# Patient Record
Sex: MALE | Race: WHITE | Employment: PART TIME | URBAN - METROPOLITAN AREA
[De-identification: names, ages, dates, MRNs, and addresses within clinical notes are randomized per-mention and may not be internally consistent; named-entity substitution may affect disease eponyms.]

---

## 2018-09-05 ENCOUNTER — HOSPITAL ENCOUNTER (EMERGENCY)
Facility: CLINIC | Age: 33
Discharge: HOME | End: 2018-09-06
Attending: EMERGENCY MEDICINE | Admitting: EMERGENCY MEDICINE
Payer: COMMERCIAL

## 2018-09-05 ENCOUNTER — APPOINTMENT (OUTPATIENT)
Dept: GENERAL RADIOLOGY | Facility: CLINIC | Age: 33
End: 2018-09-05
Attending: EMERGENCY MEDICINE
Payer: COMMERCIAL

## 2018-09-05 VITALS
SYSTOLIC BLOOD PRESSURE: 122 MMHG | TEMPERATURE: 98 F | OXYGEN SATURATION: 100 % | DIASTOLIC BLOOD PRESSURE: 70 MMHG | HEART RATE: 70 BPM | HEIGHT: 72 IN | RESPIRATION RATE: 16 BRPM

## 2018-09-05 DIAGNOSIS — S62.617A CLOSED DISPLACED FRACTURE OF PROXIMAL PHALANX OF LEFT LITTLE FINGER, INITIAL ENCOUNTER: ICD-10-CM

## 2018-09-05 DIAGNOSIS — S63.287A DISLOCATION OF PROXIMAL INTERPHALANGEAL JOINT OF LEFT LITTLE FINGER, INITIAL ENCOUNTER: ICD-10-CM

## 2018-09-05 PROCEDURE — 29125 APPL SHORT ARM SPLINT STATIC: CPT | Mod: LT | Performed by: EMERGENCY MEDICINE

## 2018-09-05 PROCEDURE — 99284 EMERGENCY DEPT VISIT MOD MDM: CPT | Mod: 25 | Performed by: EMERGENCY MEDICINE

## 2018-09-05 PROCEDURE — 73140 X-RAY EXAM OF FINGER(S): CPT | Mod: LT

## 2018-09-05 RX ORDER — HYDROCODONE BITARTRATE AND ACETAMINOPHEN 5; 325 MG/1; MG/1
1-2 TABLET ORAL EVERY 4 HOURS PRN
Qty: 12 TABLET | Refills: 0 | Status: SHIPPED | OUTPATIENT
Start: 2018-09-05 | End: 2021-03-08

## 2018-09-05 ASSESSMENT — PAIN DESCRIPTION - DESCRIPTORS: DESCRIPTORS: ACHING

## 2018-09-05 NOTE — ED AVS SNAPSHOT
[facility] Emergency Department    [address] [address]    Phone:  [phone]    Fax:  [phone]                                       Fabricio Mayberry   MRN: [id number]    Department:  [facility] Emergency Department   Date of Visit:  9/5/2018           Patient Information     Date Of Birth          1985        Your diagnoses for this visit were:     Dislocation of proximal interphalangeal joint of left little finger, initial encounter reduced by patient prior to ED evaluation    Closed displaced fracture of proximal phalanx of left little finger, initial encounter        You were seen by Reuben Bhatt MD.      Follow-up Information     Follow up with [facility].    Why:  A referral was made for you, they will call to establish a follow-up appointment.        Discharge Instructions         Finger Dislocation  A finger dislocation occurs when the tissues, or ligaments, that hold the joint together are torn. The bones then move apart, or are dislocated, out of their normal position. This causes pain, swelling, and bruising. Sometimes there is also a small chip fracture. Once the joint is put back into place again, it will take about 6 weeks for the ligaments to heal. During this time, you should protect your finger from re-injury.     Yash taping is a way to secure your injured finger to the one next to it. Yash taping allows the joint to move. But it also protects it from dislocating again. Yash tape can be left in place for up to 6 weeks.  Hand exercises may be prescribed at your follow-up visit. These can help speed healing and maintain function. In most cases you will regain full function of your finger. But it may take 12 to 18 months before all mild pain and swelling goes away and full function returns.  Home care    Keep your hand raised, or elevated, to reduce pain and swelling. When sitting or lying down, raise your arm above the level of your  heart. You can do this by placing your arm on a pillow that rests on your chest. Or your arm can be on a pillow at your side. This is most important during the first 48 hours after injury.    Put an ice pack on the injured area for no more than 15 to 20 minutes every 3 to 6 hours. Do this for the first 24 to 48 hours. You can make an ice pack by wrapping a plastic Ziploc bag of ice cubes in a thin towel. As the ice melts, be careful that the tape, gauze, or splint doesn t get wet. After that, keep using ice as needed to ease pain and swelling.    If you have a removable splint, you may take it off to bathe and then put it back on. If you have a permanent splint, cover your entire hand with 2 plastic bags. Place 1 bag around the other. Tape each bag with duct tape at the top end. Water can still leak in even when your hand is covered. So it's best to keep the splint away from water. If a splint gets wet, you can dry it with a hair-dryer on a cool setting.     If you use buddy tape and it becomes wet or dirty, change it. You may replace it with paper, plastic, or cloth tape. Cloth tape and paper tapes must be kept dry. When re-applying buddy tape, use gauze or cotton padding between your fingers. This will prevent the skin from getting moist and breaking down, or macerating. It is very important to put padding at the web space. This is the small piece of skin that joins the bases of your fingers. Keep the buddy tape in place as instructed by your healthcare provider.    You may use over-the-counter pain medicine to control pain, unless another pain medicine was prescribed. Talk with your provider before taking these medicines if you have chronic liver or kidney disease. Also talk with your provider if you have ever had a stomach ulcer or GI (gastrointestinal) bleeding.    Do not play sports or do any physical exercise until your healthcare provider says that you can.  Follow-up care  Follow up with your healthcare  provider in 1 week, or as advised. Splints should generally not be left in place longer than 3 weeks to avoid stiffness and loss of joint function. It is important that you see the referral doctor. This provider can determine how long to keep your splint in place and when to begin hand exercises.  If X-rays were taken, you will be told of any new findings that may affect your care.  When to seek medical advice  Call your healthcare provider right away if any of the following occur:    The injured finger has more pain or swelling    The injured finger becomes red or warm    The injured finger becomes cold, blue, numb, or tingly  Date Last Reviewed: 11/23/2015 2000-2017 [employer]. [address] [place] [address], [place] [address]. All rights reserved. This information is not intended as a substitute for professional medical care. Always follow your healthcare professional's instructions.          Discharge References/Attachments     FRACTURE, FINGER, CLOSED (ENGLISH)      24 Hour Appointment Hotline       To make an appointment at any [facility] clinic, call [phone] ([phone]). If you don't have a family doctor or clinic, we will help you find one. [facility] clinics are conveniently located to serve the needs of you and your family.          ED Discharge Orders     ORTHO  REFERRAL       [facility] is referring you to the Orthopedic  Services at [facility].       The  Representative will assist you in the coordination of your Orthopedic and Musculoskeletal Care as prescribed by your physician.    The  Representative will call you within 1 business day to help schedule your appointment, or you may contact the  Representative at:    All areas ~ [phone]     Type of Referral : Surgical / Specialist       Timeframe requested: 1 - 4 days    Coverage of these services is subject to the terms and limitations of your  health insurance plan.  Please call member services at your health plan with any benefit or coverage questions.      If X-rays, CT or MRI's have been performed, please contact the facility where they were done to arrange for , prior to your scheduled appointment.  Please bring this referral request to your appointment and present it to your specialist.                     Review of your medicines      START taking        Dose / Directions Last dose taken    HYDROcodone-acetaminophen 5-325 MG per tablet   Commonly known as:  NORCO   Dose:  1-2 tablet   Quantity:  12 tablet        Take 1-2 tablets by mouth every 4 hours as needed for moderate to severe pain or severe pain maximum 6 tablet(s) per day   Refills:  0          Our records show that you are taking the medicines listed below. If these are incorrect, please call your family doctor or clinic.        Dose / Directions Last dose taken    clindamycin 1 % solution   Commonly known as:  CLEOCIN T   Quantity:  30cc        apply to acne at bedtime   Refills:  one year        NO ACTIVE MEDICATIONS        .   Refills:  0                Information about OPIOIDS     PRESCRIPTION OPIOIDS: WHAT YOU NEED TO KNOW   We gave you an opioid (narcotic) pain medicine. It is important to manage your pain, but opioids are not always the best choice. You should first try all the other options your care team gave you. Take this medicine for as short a time (and as few doses) as possible.    Some activities can increase your pain, such as bandage changes or therapy sessions. It may help to take your pain medicine 30 to 60 minutes before these activities. Reduce your stress by getting enough sleep, working on hobbies you enjoy and practicing relaxation or meditation. Talk to your care team about ways to manage your pain beyond prescription opioids.    These medicines have risks:    DO NOT drive when on new or higher doses of pain medicine. These medicines can affect your  alertness and reaction times, and you could be arrested for driving under the influence (DUI). If you need to use opioids long-term, talk to your care team about driving.    DO NOT operate heavy machinery    DO NOT do any other dangerous activities while taking these medicines.    DO NOT drink any alcohol while taking these medicines.     If the opioid prescribed includes acetaminophen, DO NOT take with any other medicines that contain acetaminophen. Read all labels carefully. Look for the word  acetaminophen  or  Tylenol.  Ask your pharmacist if you have questions or are unsure.    You can get addicted to pain medicines, especially if you have a history of addiction (chemical, alcohol or substance dependence). Talk to your care team about ways to reduce this risk.    All opioids tend to cause constipation. Drink plenty of water and eat foods that have a lot of fiber, such as fruits, vegetables, prune juice, apple juice and high-fiber cereal. Take a laxative (Miralax, milk of magnesia, Colace, Senna) if you don t move your bowels at least every other day. Other side effects include upset stomach, sleepiness, dizziness, throwing up, tolerance (needing more of the medicine to have the same effect), physical dependence and slowed breathing.    Store your pills in a secure place, locked if possible. We will not replace any lost or stolen medicine. If you don t finish your medicine, please throw away (dispose) as directed by your pharmacist. The [place] [employer]ution [employer] has more information about safe disposal: https://www.pca.[facility].[place].us/living-green/managing-unwanted-medications        Prescriptions were sent or printed at these locations (1 Prescription)                   Other Prescriptions                Printed at Department/Unit printer (1 of 1)         HYDROcodone-acetaminophen (NORCO) 5-325 MG per tablet                Procedures and tests performed during your visit     Fingers XR, 2-3 views, left     "  Orders Needing Specimen Collection     None      Pending Results     Date and Time Order Name Status Description    9/5/2018 2242 Fingers XR, 2-3 views, left Preliminary             Pending Culture Results     No orders found for last 3 day(s).            Pending Results Instructions     If you had any lab results that were not finalized at the time of your Discharge, you can call the ED Lab Result RN at [phone]. You will be contacted by this team for any positive Lab results or changes in treatment. The nurses are available 7 days a week from 10A to 6:30P.  You can leave a message 24 hours per day and they will return your call.        Test Results From Your Hospital Stay        9/5/2018 11:06 PM      Narrative     LEFT FIFTH FINGER 3 VIEWS  9/5/2018 10:59 PM     HISTORY: Middle finger proximal interphalangeal joint dislocation,  reduced by patient.    COMPARISON: None.        Impression     IMPRESSION:  1. Minimally displaced acute longitudinally oriented fracture of the  diaphysis of the left fifth proximal phalanx.  2. Normal alignment of the left fifth finger.                Thank you for choosing [facility]       Thank you for choosing [facility] for your care. Our goal is always to provide you with excellent care. Hearing back from our patients is one way we can continue to improve our services. Please take a few minutes to complete the written survey that you may receive in the mail after you visit with us. Thank you!        [employer] Information     StatusPage lets you send messages to your doctor, view your test results, renew your prescriptions, schedule appointments and more. To sign up, go to [email or web address] . Click on \"Log in\" on the left side of the screen, which will take you to the Welcome page. Then click on \"Sign up Now\" on the right side of the page.     You will be asked to enter the access code listed below, as well as some personal information. Please follow the directions to create " your username and password.     Your access code is: [email or web address]  Expires: 2018 12:07 AM     Your access code will  in 90 days. If you need help or a new code, please call your [facility] clinic or [phone].        Care EveryWhere ID     This is your Care EveryWhere ID. This could be used by other organizations to access your [facility] medical records  [id number]        Equal Access to Services     DANYELLE MANN : Hadii maci guzman hadasho Soomaali, waaxda luqadaha, qaybta kaalmada adeegyada, monique grimaldo . So [facility] [phone].    ATENCIÓN: Si habla español, tiene a tyson disposición servicios gratuitos de asistencia lingüística. Llame al [phone].    We comply with applicable federal civil rights laws and [place] laws. We do not discriminate on the basis of race, color, national origin, age, disability, sex, sexual orientation, or gender identity.            After Visit Summary       This is your record. Keep this with you and show to your community pharmacist(s) and doctor(s) at your next visit.

## 2018-09-05 NOTE — ED AVS SNAPSHOT
[facility] Emergency Department    [address] [address]    Phone:  [phone]    Fax:  [phone]                                       Fabricio Mayberry   MRN: [id number]    Department:  [facility] Emergency Department   Date of Visit:  9/5/2018           After Visit Summary Signature Page     I have received my discharge instructions, and my questions have been answered. I have discussed any challenges I see with this plan with the nurse or doctor.    ..........................................................................................................................................  Patient/Patient Representative Signature      ..........................................................................................................................................  Patient Representative Print Name and Relationship to Patient    ..................................................               ................................................  Date                                            Time    ..........................................................................................................................................  Reviewed by Signature/Title    ...................................................              ..............................................  Date                                                            Time          22EPIC Rev 08/18

## 2018-09-06 ASSESSMENT — ENCOUNTER SYMPTOMS
JOINT SWELLING: 1
WEAKNESS: 0
NECK PAIN: 0
NUMBNESS: 0
BACK PAIN: 0

## 2018-09-06 NOTE — DISCHARGE INSTRUCTIONS
Finger Dislocation  A finger dislocation occurs when the tissues, or ligaments, that hold the joint together are torn. The bones then move apart, or are dislocated, out of their normal position. This causes pain, swelling, and bruising. Sometimes there is also a small chip fracture. Once the joint is put back into place again, it will take about 6 weeks for the ligaments to heal. During this time, you should protect your finger from re-injury.     Buddy taping is a way to secure your injured finger to the one next to it. Buddy taping allows the joint to move. But it also protects it from dislocating again. Buddy tape can be left in place for up to 6 weeks.  Hand exercises may be prescribed at your follow-up visit. These can help speed healing and maintain function. In most cases you will regain full function of your finger. But it may take 12 to 18 months before all mild pain and swelling goes away and full function returns.  Home care    Keep your hand raised, or elevated, to reduce pain and swelling. When sitting or lying down, raise your arm above the level of your heart. You can do this by placing your arm on a pillow that rests on your chest. Or your arm can be on a pillow at your side. This is most important during the first 48 hours after injury.    Put an ice pack on the injured area for no more than 15 to 20 minutes every 3 to 6 hours. Do this for the first 24 to 48 hours. You can make an ice pack by wrapping a plastic Ziploc bag of ice cubes in a thin towel. As the ice melts, be careful that the tape, gauze, or splint doesn t get wet. After that, keep using ice as needed to ease pain and swelling.    If you have a removable splint, you may take it off to bathe and then put it back on. If you have a permanent splint, cover your entire hand with 2 plastic bags. Place 1 bag around the other. Tape each bag with duct tape at the top end. Water can still leak in even when your hand is covered. So it's best to  keep the splint away from water. If a splint gets wet, you can dry it with a hair-dryer on a cool setting.     If you use vianey tape and it becomes wet or dirty, change it. You may replace it with paper, plastic, or cloth tape. Cloth tape and paper tapes must be kept dry. When re-applying vianey tape, use gauze or cotton padding between your fingers. This will prevent the skin from getting moist and breaking down, or macerating. It is very important to put padding at the web space. This is the small piece of skin that joins the bases of your fingers. Keep the vianey tape in place as instructed by your healthcare provider.    You may use over-the-counter pain medicine to control pain, unless another pain medicine was prescribed. Talk with your provider before taking these medicines if you have chronic liver or kidney disease. Also talk with your provider if you have ever had a stomach ulcer or GI (gastrointestinal) bleeding.    Do not play sports or do any physical exercise until your healthcare provider says that you can.  Follow-up care  Follow up with your healthcare provider in 1 week, or as advised. Splints should generally not be left in place longer than 3 weeks to avoid stiffness and loss of joint function. It is important that you see the referral doctor. This provider can determine how long to keep your splint in place and when to begin hand exercises.  If X-rays were taken, you will be told of any new findings that may affect your care.  When to seek medical advice  Call your healthcare provider right away if any of the following occur:    The injured finger has more pain or swelling    The injured finger becomes red or warm    The injured finger becomes cold, blue, numb, or tingly  Date Last Reviewed: 11/23/2015 2000-2017 [employer]. [address] [place] [address], [place] [address]. All rights reserved. This information is not intended as a substitute for professional medical care. Always follow  your healthcare professional's instructions.

## 2018-09-06 NOTE — ED NOTES
"Fabricio Mayberry is 33 year old male presents to the ED with complaints of pain to the left 5th digit. PT states at 1730 he was walking down stairs when he slipped, grabbed the railing and wedged his finger between the wall and railing. States initially it was deformed and he \"popped\" it back into place. States he has increasing pain. CMS intact, bruising and slight swelling noted at this time. PT is A&OX4, appears to be in mild pain. All needs are being assessed and will be met and all comfort measures are being addressed. Awaiting MD pedroza and orders at this time.   "

## 2018-09-06 NOTE — ED PROVIDER NOTES
History     Chief Complaint   Patient presents with     Hand Injury     HPI  Fabricio Mayberry is a 33 year old male who injured his left little finger at approximately 1730 p.m. when he caught it in a stair railing when he slipped while going downstairs.  States that the left little finger appear dislocated at the PIP joint with deformity of the distal segment of the finger in the radial direction.  He is unable to tell if the finger was angulated volarly or dorsally.  He manually reduce the dislocation himself shortly after the injury.  He now has pain and swelling at the PIP joint and of the area of the proximal phalanx.  Skin is intact.  Distal CMS function intact.  He is right-handed. No other injury or trauma.  No other acute complaints or concerns.    Problem List:    Patient Active Problem List    Diagnosis Date Noted     Other acne 01/23/2008     Priority: Medium        Past Medical History:    No past medical history on file.    Past Surgical History:    Past Surgical History:   Procedure Laterality Date     SURGICAL HISTORY OF -   3/25/2001    ORIF mandibular right parasymphysis fx ORIF left angle fx.     SURGICAL HISTORY OF -   3/25/2001    Extraction of teeth #1, #16, #17, and #32     SURGICAL HISTORY OF -   3/25/2001    Application of arch bars     SURGICAL HISTORY OF -   3/3/2000    Closed reduction, percutaneous pinning left Salter-II distal radius fx.     SURGICAL HISTORY OF -   2/24/2000    Closed reduction and casting, left distal radius     SURGICAL HISTORY OF -   3/16/1995    Excision of skin left popliteal area       Family History:    No family history on file.    Social History:  Marital Status:  Single [1]  Social History   Substance Use Topics     Smoking status: Current Every Day Smoker     Packs/day: 0.50     Years: 6.00     Types: Cigarettes     Smokeless tobacco: Not on file     Alcohol use Yes      Comment: occ.        Medications:      HYDROcodone-acetaminophen (NORCO) 5-325 MG per  tablet   CLINDAMYCIN PHOSPHATE 1 % EX SOLN   NO ACTIVE MEDICATIONS         Review of Systems   Musculoskeletal: Positive for joint swelling ( Left little finger PIP joint swelling and ecchymosis). Negative for back pain and neck pain.   Skin: Negative.    Neurological: Negative for weakness and numbness.       Physical Exam   BP: 119/72  Pulse: 70  Heart Rate: 74  Temp: 98  F (36.7  C)  Resp: 14  Height: 182.9 cm (6')  SpO2: 99 %      Physical Exam   Constitutional: He is oriented to person, place, and time. He appears well-developed and well-nourished. No distress.   HENT:   Head: Normocephalic and atraumatic.   Eyes: Conjunctivae and EOM are normal. No scleral icterus.   Neck: Normal range of motion. Neck supple. No tracheal deviation present.   Cardiovascular: Normal rate, regular rhythm and intact distal pulses.    Pulmonary/Chest: Effort normal. No respiratory distress.   Musculoskeletal: He exhibits edema ( Left little finger) and tenderness ( Left little finger).        Left hand: He exhibits decreased range of motion, tenderness, bony tenderness and swelling. He exhibits normal capillary refill, no deformity and no laceration. Normal sensation noted. Normal strength noted.        Hands:  Neurological: He is alert and oriented to person, place, and time. He has normal strength. No sensory deficit.   Skin: Skin is warm and dry. No rash noted. No erythema. No pallor.   Psychiatric: He has a normal mood and affect. His behavior is normal.   Nursing note and vitals reviewed.      ED Course     ED Course     Orthopedic injury tx  Performed by: UGO ARANDA  Authorized by: UGO ARANDA   Consent: Verbal consent obtained.  Risks and benefits: risks, benefits and alternatives were discussed  Consent given by: patient  Patient understanding: patient states understanding of the procedure being performed  Patient consent: the patient's understanding of the procedure matches consent given  Injury location:  finger  Location details: left little finger  Injury type: fracture  Fracture type: proximal phalanx  MCP joint involved: no  IP joint involved: no  Pre-procedure neurovascular assessment: neurovascularly intact  Pre-procedure distal perfusion: normal  Pre-procedure neurological function: normal  Manipulation performed: no  Immobilization: splint  Splint type: ulnar gutter  Supplies used: Ortho-Glass  Post-procedure neurovascular assessment: post-procedure neurovascularly intact  Post-procedure distal perfusion: normal  Post-procedure neurological function: normal  Patient tolerance: Patient tolerated the procedure well with no immediate complications                       Results for orders placed or performed during the hospital encounter of 09/05/18 (from the past 24 hour(s))   Fingers XR, 2-3 views, left    Narrative    LEFT FIFTH FINGER 3 VIEWS  9/5/2018 10:59 PM     HISTORY: Middle finger proximal interphalangeal joint dislocation,  reduced by patient.    COMPARISON: None.      Impression    IMPRESSION:  1. Minimally displaced acute longitudinally oriented fracture of the  diaphysis of the left fifth proximal phalanx.  2. Normal alignment of the left fifth finger.       Medications - No data to display  Opiate analgesic deferred, patient drove himself to the ED.  He has already taken ibuprofen in the past 6 hours.    Assessments & Plan (with Medical Decision Making)   33-year-old male with left little finger injury and history of associated left little finger PIP joint dislocation which was reduced by him prior to arrival.  X-rays revealed a minimally displaced longitudinal fracture of the proximal phalanx of the left little finger, not involving the joints.  Neurovascularly intact. Finger was immobilized with an ulnar gutter splint and an orthopedic  referral was made for his follow-up.  He was prescribed Norco use for pain refractory to ibuprofen, and instructed in supportive care.    I have  reviewed the nursing notes.    I have reviewed the findings, diagnosis, plan and need for follow up with the patient.    New Prescriptions    HYDROCODONE-ACETAMINOPHEN (NORCO) 5-325 MG PER TABLET    Take 1-2 tablets by mouth every 4 hours as needed for moderate to severe pain or severe pain maximum 6 tablet(s) per day       Final diagnoses:   Dislocation of proximal interphalangeal joint of left little finger, initial encounter - reduced by patient prior to ED evaluation   Closed displaced fracture of proximal phalanx of left little finger, initial encounter       9/5/2018   [facility] EMERGENCY DEPARTMENT     Reuben Bhatt MD  09/10/18 1015

## 2018-09-11 ENCOUNTER — TRANSFERRED RECORDS (OUTPATIENT)
Dept: HEALTH INFORMATION MANAGEMENT | Facility: CLINIC | Age: 33
End: 2018-09-11

## 2018-09-25 ENCOUNTER — TRANSFERRED RECORDS (OUTPATIENT)
Dept: HEALTH INFORMATION MANAGEMENT | Facility: CLINIC | Age: 33
End: 2018-09-25

## 2018-10-09 ENCOUNTER — TRANSFERRED RECORDS (OUTPATIENT)
Dept: HEALTH INFORMATION MANAGEMENT | Facility: CLINIC | Age: 33
End: 2018-10-09

## 2018-11-09 ENCOUNTER — HOSPITAL ENCOUNTER (EMERGENCY)
Facility: CLINIC | Age: 33
Discharge: HOME | End: 2018-11-09
Attending: STUDENT IN AN ORGANIZED HEALTH CARE EDUCATION/TRAINING PROGRAM | Admitting: STUDENT IN AN ORGANIZED HEALTH CARE EDUCATION/TRAINING PROGRAM
Payer: COMMERCIAL

## 2018-11-09 VITALS
SYSTOLIC BLOOD PRESSURE: 130 MMHG | BODY MASS INDEX: 22.38 KG/M2 | TEMPERATURE: 97.4 F | WEIGHT: 165 LBS | HEART RATE: 78 BPM | DIASTOLIC BLOOD PRESSURE: 85 MMHG | RESPIRATION RATE: 14 BRPM | OXYGEN SATURATION: 98 %

## 2018-11-09 DIAGNOSIS — S81.811A LACERATION OF RIGHT LOWER EXTREMITY, INITIAL ENCOUNTER: ICD-10-CM

## 2018-11-09 PROCEDURE — 25000128 H RX IP 250 OP 636: Performed by: STUDENT IN AN ORGANIZED HEALTH CARE EDUCATION/TRAINING PROGRAM

## 2018-11-09 PROCEDURE — 90471 IMMUNIZATION ADMIN: CPT | Performed by: STUDENT IN AN ORGANIZED HEALTH CARE EDUCATION/TRAINING PROGRAM

## 2018-11-09 PROCEDURE — 12001 RPR S/N/AX/GEN/TRNK 2.5CM/<: CPT | Performed by: STUDENT IN AN ORGANIZED HEALTH CARE EDUCATION/TRAINING PROGRAM

## 2018-11-09 PROCEDURE — 12001 RPR S/N/AX/GEN/TRNK 2.5CM/<: CPT | Mod: Z6 | Performed by: STUDENT IN AN ORGANIZED HEALTH CARE EDUCATION/TRAINING PROGRAM

## 2018-11-09 PROCEDURE — 99282 EMERGENCY DEPT VISIT SF MDM: CPT | Mod: 25 | Performed by: STUDENT IN AN ORGANIZED HEALTH CARE EDUCATION/TRAINING PROGRAM

## 2018-11-09 PROCEDURE — 90715 TDAP VACCINE 7 YRS/> IM: CPT | Performed by: STUDENT IN AN ORGANIZED HEALTH CARE EDUCATION/TRAINING PROGRAM

## 2018-11-09 PROCEDURE — 99283 EMERGENCY DEPT VISIT LOW MDM: CPT | Mod: 25 | Performed by: STUDENT IN AN ORGANIZED HEALTH CARE EDUCATION/TRAINING PROGRAM

## 2018-11-09 RX ORDER — BUPIVACAINE HYDROCHLORIDE 5 MG/ML
INJECTION, SOLUTION PERINEURAL
Status: DISCONTINUED
Start: 2018-11-09 | End: 2018-11-10 | Stop reason: HOSPADM

## 2018-11-09 RX ADMIN — CLOSTRIDIUM TETANI TOXOID ANTIGEN (FORMALDEHYDE INACTIVATED), CORYNEBACTERIUM DIPHTHERIAE TOXOID ANTIGEN (FORMALDEHYDE INACTIVATED), BORDETELLA PERTUSSIS TOXOID ANTIGEN (GLUTARALDEHYDE INACTIVATED), BORDETELLA PERTUSSIS FILAMENTOUS HEMAGGLUTININ ANTIGEN (FORMALDEHYDE INACTIVATED), BORDETELLA PERTUSSIS PERTACTIN ANTIGEN, AND BORDETELLA PERTUSSIS FIMBRIAE 2/3 ANTIGEN 0.5 ML: 5; 2; 2.5; 5; 3; 5 INJECTION, SUSPENSION INTRAMUSCULAR at 21:13

## 2018-11-09 NOTE — ED AVS SNAPSHOT
[facility] Emergency Department    [address] [address]    Phone:  [phone]    Fax:  [phone]                                       Fabricio Mayberry   MRN: [id number]    Department:  [facility] Emergency Department   Date of Visit:  11/9/2018           After Visit Summary Signature Page     I have received my discharge instructions, and my questions have been answered. I have discussed any challenges I see with this plan with the nurse or doctor.    ..........................................................................................................................................  Patient/Patient Representative Signature      ..........................................................................................................................................  Patient Representative Print Name and Relationship to Patient    ..................................................               ................................................  Date                                   Time    ..........................................................................................................................................  Reviewed by Signature/Title    ...................................................              ..............................................  Date                                               Time          22EPIC Rev 08/18

## 2018-11-09 NOTE — ED AVS SNAPSHOT
[facility] Emergency Department    [address] [address]    Phone:  [phone]    Fax:  [phone]                                       Fabricio Mayberry   MRN: [id number]    Department:  [facility] Emergency Department   Date of Visit:  11/9/2018           Patient Information     Date Of Birth          1985        Your diagnoses for this visit were:     Laceration of right lower extremity, initial encounter        You were seen by Abebe Murray DO.      Follow-up Information     Follow up with Eliu Ndiaye MD. Schedule an appointment as soon as possible for a visit in 10 days.    Specialty:  Family Practice    Why:  For reevaluation of wound and suture removal.    Contact information:    [address] [facility] [address]  [phone]          Discharge Instructions         Extremity Laceration: Stitches, Staples, or Tape  A laceration is a cut through the skin. If it is deep, it may require stitches or staples to close so it can heal. Minor cuts may be treated with surgical tape closures, or skin glue.  X-rays may be done if something may have entered the skin through the cut. You may also need a tetanus shot if you are not up to date on this vaccine.  Home care    Follow the healthcare provider s instructions on how to care for the cut.    Wash your hands with soap and warm water before and after caring for your wound. This is to help prevent infection.    Keep the wound clean and dry. If a bandage was applied and it becomes wet or dirty, replace it. Otherwise, leave it in place for the first 24 hours, then change it once a day or as directed.    If stitches or staples were used, clean the wound daily:  ? After removing the bandage, wash the area with soap and water. Use a wet cotton swab to loosen and remove any blood or crust that forms.  ? After cleaning, keep the wound clean and dry. Talk with your healthcare provider before putting any antibiotic ointment  on the wound. Reapply the bandage.    You may remove the bandage to shower as usual after the first 24 hours, but don't soak the area in water (no swimming) until the stitches or staples are removed.    If surgical tape closures were used, keep the area clean and dry. If it becomes wet, blot it dry with a towel. Let the surgical tape fall off on its own.    The healthcare provider may prescribe an antibiotic cream or ointment to prevent infection. He or she may also prescribe an antibiotic pill. Don't stop taking this medicine until you have finished it all or the provider tells you to stop.    The provider may also prescribe medicine for pain. Follow the instructions for taking these medicines.    Don't do activities that may reopen your wound.  Follow-up care  Follow up with your healthcare provider, or as advised. Most skin wounds heal within 10 days. But an infection may sometimes occur even with proper treatment. Check the wound daily for the signs of infection listed below. Stitches and staples should be removed within 7 to14 days. If surgical tape closures were used, you may remove them after 10 days if they have not fallen off by then.   When to seek medical advice  Call your healthcare provider right away if any of these occur:    Wound bleeding not controlled by direct pressure    Signs of infection, including increasing pain in the wound, increasing wound redness or swelling, or pus or bad odor coming from the wound    Fever of 100.4 F (38 C) or higher, or as directed by your healthcare provider    Stitches or staples come apart or fall out or surgical tape falls off before 7 days    Wound edges reopen    Wound changes colors    Numbness occurs around the wound     Decreased movement around the injured area  Date Last Reviewed: 7/1/2017 2000-2018 [employer]. [address] [place] [address], [place] [address]. All rights reserved. This information is not intended as a substitute for professional  medical care. Always follow your healthcare professional's instructions.          24 Hour Appointment Hotline       To make an appointment at any [facility] clinic, call [phone] ([phone]). If you don't have a family doctor or clinic, we will help you find one. [facility] clinics are conveniently located to serve the needs of you and your family.             Review of your medicines      Our records show that you are taking the medicines listed below. If these are incorrect, please call your family doctor or clinic.        Dose / Directions Last dose taken    clindamycin 1 % solution   Commonly known as:  CLEOCIN T   Quantity:  30cc        apply to acne at bedtime   Refills:  one year        HYDROcodone-acetaminophen 5-325 MG per tablet   Commonly known as:  NORCO   Dose:  1-2 tablet   Quantity:  12 tablet        Take 1-2 tablets by mouth every 4 hours as needed for moderate to severe pain or severe pain maximum 6 tablet(s) per day   Refills:  0        NO ACTIVE MEDICATIONS        .   Refills:  0                Orders Needing Specimen Collection     None      Pending Results     No orders found from 11/7/2018 to 11/10/2018.            Pending Culture Results     No orders found from 11/7/2018 to 11/10/2018.            Pending Results Instructions     If you had any lab results that were not finalized at the time of your Discharge, you can call the ED Lab Result RN at [phone]. You will be contacted by this team for any positive Lab results or changes in treatment. The nurses are available 7 days a week from 10A to 6:30P.  You can leave a message 24 hours per day and they will return your call.        Test Results From Your Hospital Stay               Thank you for choosing [facility]       Thank you for choosing [facility] for your care. Our goal is always to provide you with excellent care. Hearing back from our patients is one way we can continue to improve our services. Please take a few minutes to  "complete the written survey that you may receive in the mail after you visit with us. Thank you!        [employer] Information     [employer] lets you send messages to your doctor, view your test results, renew your prescriptions, schedule appointments and more. To sign up, go to [email or web address] . Click on \"Log in\" on the left side of the screen, which will take you to the Welcome page. Then click on \"Sign up Now\" on the right side of the page.     You will be asked to enter the access code listed below, as well as some personal information. Please follow the directions to create your username and password.     Your access code is: [email or web address]  Expires: 2018 11:07 PM     Your access code will  in 90 days. If you need help or a new code, please call your [facility] clinic or [phone].        Care EveryWhere ID     This is your Care EveryWhere ID. This could be used by other organizations to access your [facility] medical records  [id number]        Equal Access to Services     [facility]: Hadii maci churchill Sowaldo, waaxda luqadaha, qaybta kaalmaashley adejenny, monique grimaldo . So [facility] [phone].    ATENCIÓN: Si habla español, tiene a tyson disposición servicios gratuitos de asistencia lingüística. Llame al [phone].    We comply with applicable federal civil rights laws and [place] laws. We do not discriminate on the basis of race, color, national origin, age, disability, sex, sexual orientation, or gender identity.            After Visit Summary       This is your record. Keep this with you and show to your community pharmacist(s) and doctor(s) at your next visit.                  "

## 2018-11-10 NOTE — ED PROVIDER NOTES
"  History     Chief Complaint   Patient presents with     Laceration     self from knife while \" gutting deer\" 1.5-2inch long per patient.      HPI  Fabrciio Mayberry is a 33 year old male who presents for evaluation of laceration along right calf sustained just prior to arrival.  Patient explains that he was \"getting a deer\" with a clean knife when the knife slipped resulting in accidental cutting along the medial aspect of his mid right calf.  The incident occurred just prior to arrival.  The patient believes the knife was fairly clean and did not appreciate any dirt or debris around his wound.  No weakness or sensory deficits.  Bleeding controlled prior to arrival.  No other complaints or injuries.    Problem List:    Patient Active Problem List    Diagnosis Date Noted     Other acne 01/23/2008     Priority: Medium        Past Medical History:    No past medical history on file.    Past Surgical History:    Past Surgical History:   Procedure Laterality Date     SURGICAL HISTORY OF -   3/25/2001    ORIF mandibular right parasymphysis fx ORIF left angle fx.     SURGICAL HISTORY OF -   3/25/2001    Extraction of teeth #1, #16, #17, and #32     SURGICAL HISTORY OF -   3/25/2001    Application of arch bars     SURGICAL HISTORY OF -   3/3/2000    Closed reduction, percutaneous pinning left Salter-II distal radius fx.     SURGICAL HISTORY OF -   2/24/2000    Closed reduction and casting, left distal radius     SURGICAL HISTORY OF -   3/16/1995    Excision of skin left popliteal area       Family History:    No family history on file.    Social History:  Marital Status:  Single [1]  Social History   Substance Use Topics     Smoking status: Current Every Day Smoker     Packs/day: 0.50     Years: 6.00     Types: Cigarettes     Smokeless tobacco: Not on file     Alcohol use Yes      Comment: occ.        Medications:      CLINDAMYCIN PHOSPHATE 1 % EX SOLN   HYDROcodone-acetaminophen (NORCO) 5-325 MG per tablet   NO ACTIVE " MEDICATIONS         Review of Systems  Constitutional:  Negative for fever or illness.  Musculoskeletal:  Negative for bony pain.  Neurological:  Negative for sensory deficit.  Skin: Positive for laceration of right leg.    All others reviewed and are negative.    Physical Exam   BP: 134/83  Pulse: 78  Heart Rate: 75  Temp: 97.4  F (36.3  C)  Resp: 14  Weight: 74.8 kg (165 lb)  SpO2: 99 %      Physical Exam  Constitutional:  Well developed, well nourished.  Appears nontoxic and in no acute distress.   HENT:  Normocephalic and atraumatic.  Eyes:  Conjunctivae are normal.  Neck:  Neck supple.  Cardiovascular:  No cyanosis.   Respiratory:  Effort normal, no respiratory distress.   Musculoskeletal: 1.5 cm horizontal laceration of right medial calf, no heavy contamination or obvious foreign body.  Surrounding sensation intact.    Neurological:  Patient is alert.  Skin:  Skin is warm and dry.  Psychiatric:  Normal mood and affect.      ED Course     ED Course     Procedures               Critical Care time:  [facility] Emergency Department Procedure Note       Procedure:  Laceration Repair   Performed by:  Abebe Murray    Consent:  Patient who states an understanding of the procedure being performed after discussing the risks, benefits, and alternatives of the agreed method of wound repair.  They understand that although the wound has been cleaned/irrigated thoroughly, we cannot with absolute certainty prevent infection and they must monitor the healing process closely for signs of infection.  Also, all skin wounds will form a scar but the repair will improve the cosmetic outcomes.    Body area:  Right leg  Laceration length:  1.5 cm  Contamination:  The wound is not contaminated.  Foreign bodies after meticulous investigation:  none  Tendon involvement:  none  Nerve involvement:  Sensation completely intact.  Anesthesia type:  Local  Local anesthetic agent:  Bupivacaine 0.5%  Anesthetic total:  3 ml  Irrigation:   Copious irrigation via normal saline.  Preparation:  Patient was cleaned and draped in usual sterile fashion for repair.    Debridement:  none  Skin closure:  Closed with 3 x 4.0 Ethilon  Technique:  interrupted  Approximation:  Close  Approximation difficulty:  Simple    Patient tolerance: Patient tolerated the procedure well with no immediate complications.    Nursing staff was assigned to apply antibiotic ointment and dress the wound after the repair.     They have been instructed to monitor wound healing closely for any signs of infection.                No results found for this or any previous visit (from the past 24 hour(s)).    Medications   bupivacaine (MARCAINE) 0.5 % injection (not administered)   Tdap (tetanus-diphtheria-acell pertussis) (ADACEL) injection 0.5 mL (0.5 mLs Intramuscular Given 11/9/18 2113)       Assessments & Plan (with Medical Decision Making)   Fabricio Mayberry is a 33 year old male who presents to the department for evaluation of right leg laceration which occurred while accidentally cutting a haunted ear prior to arrival. Based on his symptoms and the clinical examination, there is no evidence to suggest bony injury, knee joint involvement, tendon laceration, or neurovascular deficits. His wound was cleaned, irrigated, thoroughly inspected and no foreign body or heavy contamination found.  The laceration repaired, wound edges well approximated, and the patient tolerated well without complications.  After repair antibiotic ointment was applied and the wound was dressed.  They were instructed to remove the bandage within 24 hours and apply OTC antibiotic ointment twice daily while healing.  Recommended follow-up in 8-10 days at a primary care clinic for reevaluation of the wound and removal of sutures.     During the repair we discussed the likelihood that there will be some residual scarring.  Although the wound was thoroughly cleaned/irrigated, he has been instructed to monitor the wound  closely for healing or signs of infection such as increasing pain, redness, discharge, or fever.  If any are present they should return to the emergency department.      Disclaimer: This note consists of symbols derived from keyboarding, dictation, and/or voice recognition software. As a result, there may be errors in the script that have gone undetected.  Please consider this when interpreting information found in the chart.         I have reviewed the nursing notes.    I have reviewed the findings, diagnosis, plan and need for follow up with the patient.       Discharge Medication List as of 11/9/2018 10:07 PM          Final diagnoses:   Laceration of right lower extremity, initial encounter       11/9/2018   [facility] EMERGENCY DEPARTMENT     Abebe Murray DO  11/09/18 8353

## 2018-11-10 NOTE — DISCHARGE INSTRUCTIONS
Extremity Laceration: Stitches, Staples, or Tape  A laceration is a cut through the skin. If it is deep, it may require stitches or staples to close so it can heal. Minor cuts may be treated with surgical tape closures, or skin glue.  X-rays may be done if something may have entered the skin through the cut. You may also need a tetanus shot if you are not up to date on this vaccine.  Home care    Follow the healthcare provider s instructions on how to care for the cut.    Wash your hands with soap and warm water before and after caring for your wound. This is to help prevent infection.    Keep the wound clean and dry. If a bandage was applied and it becomes wet or dirty, replace it. Otherwise, leave it in place for the first 24 hours, then change it once a day or as directed.    If stitches or staples were used, clean the wound daily:  ? After removing the bandage, wash the area with soap and water. Use a wet cotton swab to loosen and remove any blood or crust that forms.  ? After cleaning, keep the wound clean and dry. Talk with your healthcare provider before putting any antibiotic ointment on the wound. Reapply the bandage.    You may remove the bandage to shower as usual after the first 24 hours, but don't soak the area in water (no swimming) until the stitches or staples are removed.    If surgical tape closures were used, keep the area clean and dry. If it becomes wet, blot it dry with a towel. Let the surgical tape fall off on its own.    The healthcare provider may prescribe an antibiotic cream or ointment to prevent infection. He or she may also prescribe an antibiotic pill. Don't stop taking this medicine until you have finished it all or the provider tells you to stop.    The provider may also prescribe medicine for pain. Follow the instructions for taking these medicines.    Don't do activities that may reopen your wound.  Follow-up care  Follow up with your healthcare provider, or as advised. Most  skin wounds heal within 10 days. But an infection may sometimes occur even with proper treatment. Check the wound daily for the signs of infection listed below. Stitches and staples should be removed within 7 to14 days. If surgical tape closures were used, you may remove them after 10 days if they have not fallen off by then.   When to seek medical advice  Call your healthcare provider right away if any of these occur:    Wound bleeding not controlled by direct pressure    Signs of infection, including increasing pain in the wound, increasing wound redness or swelling, or pus or bad odor coming from the wound    Fever of 100.4 F (38 C) or higher, or as directed by your healthcare provider    Stitches or staples come apart or fall out or surgical tape falls off before 7 days    Wound edges reopen    Wound changes colors    Numbness occurs around the wound     Decreased movement around the injured area  Date Last Reviewed: 7/1/2017 2000-2018 [employer]. [address] [place] [address], [place] [address]. All rights reserved. This information is not intended as a substitute for professional medical care. Always follow your healthcare professional's instructions.

## 2021-03-08 ENCOUNTER — OFFICE VISIT (OUTPATIENT)
Dept: FAMILY MEDICINE | Facility: CLINIC | Age: 36
End: 2021-03-08
Payer: COMMERCIAL

## 2021-03-08 VITALS
HEART RATE: 88 BPM | BODY MASS INDEX: 25.4 KG/M2 | RESPIRATION RATE: 12 BRPM | HEIGHT: 71 IN | SYSTOLIC BLOOD PRESSURE: 132 MMHG | TEMPERATURE: 98.1 F | OXYGEN SATURATION: 98 % | DIASTOLIC BLOOD PRESSURE: 72 MMHG | WEIGHT: 181.4 LBS

## 2021-03-08 DIAGNOSIS — Z00.00 ROUTINE GENERAL MEDICAL EXAMINATION AT A HEALTH CARE FACILITY: Primary | ICD-10-CM

## 2021-03-08 DIAGNOSIS — R10.84 ABDOMINAL PAIN, GENERALIZED: ICD-10-CM

## 2021-03-08 LAB
ALBUMIN SERPL-MCNC: 4.3 G/DL (ref 3.4–5)
ALBUMIN UR-MCNC: NEGATIVE MG/DL
ALP SERPL-CCNC: 63 U/L (ref 40–150)
ALT SERPL W P-5'-P-CCNC: 27 U/L (ref 0–70)
ANION GAP SERPL CALCULATED.3IONS-SCNC: 3 MMOL/L (ref 3–14)
APPEARANCE UR: CLEAR
AST SERPL W P-5'-P-CCNC: 22 U/L (ref 0–45)
BASOPHILS # BLD AUTO: 0 10E9/L (ref 0–0.2)
BASOPHILS NFR BLD AUTO: 0.4 %
BILIRUB SERPL-MCNC: 0.5 MG/DL (ref 0.2–1.3)
BILIRUB UR QL STRIP: NEGATIVE
BUN SERPL-MCNC: 10 MG/DL (ref 7–30)
CALCIUM SERPL-MCNC: 9.3 MG/DL (ref 8.5–10.1)
CHLORIDE SERPL-SCNC: 103 MMOL/L (ref 94–109)
CO2 SERPL-SCNC: 31 MMOL/L (ref 20–32)
COLOR UR AUTO: YELLOW
CREAT SERPL-MCNC: 0.85 MG/DL (ref 0.66–1.25)
DIFFERENTIAL METHOD BLD: NORMAL
EOSINOPHIL # BLD AUTO: 0.1 10E9/L (ref 0–0.7)
EOSINOPHIL NFR BLD AUTO: 2 %
ERYTHROCYTE [DISTWIDTH] IN BLOOD BY AUTOMATED COUNT: 13.4 % (ref 10–15)
GFR SERPL CREATININE-BSD FRML MDRD: >90 ML/MIN/{1.73_M2}
GLUCOSE SERPL-MCNC: 87 MG/DL (ref 70–99)
GLUCOSE UR STRIP-MCNC: NEGATIVE MG/DL
HCT VFR BLD AUTO: 44.9 % (ref 40–53)
HGB BLD-MCNC: 14.5 G/DL (ref 13.3–17.7)
HGB UR QL STRIP: NEGATIVE
KETONES UR STRIP-MCNC: NEGATIVE MG/DL
LEUKOCYTE ESTERASE UR QL STRIP: NEGATIVE
LIPASE SERPL-CCNC: 104 U/L (ref 73–393)
LYMPHOCYTES # BLD AUTO: 1.8 10E9/L (ref 0.8–5.3)
LYMPHOCYTES NFR BLD AUTO: 25.4 %
MCH RBC QN AUTO: 31.5 PG (ref 26.5–33)
MCHC RBC AUTO-ENTMCNC: 32.3 G/DL (ref 31.5–36.5)
MCV RBC AUTO: 98 FL (ref 78–100)
MONOCYTES # BLD AUTO: 0.6 10E9/L (ref 0–1.3)
MONOCYTES NFR BLD AUTO: 8 %
NEUTROPHILS # BLD AUTO: 4.5 10E9/L (ref 1.6–8.3)
NEUTROPHILS NFR BLD AUTO: 64.2 %
NITRATE UR QL: NEGATIVE
PH UR STRIP: 6 PH (ref 5–7)
PLATELET # BLD AUTO: 267 10E9/L (ref 150–450)
POTASSIUM SERPL-SCNC: 3.4 MMOL/L (ref 3.4–5.3)
PROT SERPL-MCNC: 7.4 G/DL (ref 6.8–8.8)
RBC # BLD AUTO: 4.6 10E12/L (ref 4.4–5.9)
SODIUM SERPL-SCNC: 137 MMOL/L (ref 133–144)
SOURCE: NORMAL
SP GR UR STRIP: <=1.005 (ref 1–1.03)
UROBILINOGEN UR STRIP-ACNC: 0.2 EU/DL (ref 0.2–1)
WBC # BLD AUTO: 7 10E9/L (ref 4–11)

## 2021-03-08 PROCEDURE — 80053 COMPREHEN METABOLIC PANEL: CPT | Performed by: FAMILY MEDICINE

## 2021-03-08 PROCEDURE — 85025 COMPLETE CBC W/AUTO DIFF WBC: CPT | Performed by: FAMILY MEDICINE

## 2021-03-08 PROCEDURE — 36415 COLL VENOUS BLD VENIPUNCTURE: CPT | Performed by: FAMILY MEDICINE

## 2021-03-08 PROCEDURE — 81003 URINALYSIS AUTO W/O SCOPE: CPT | Performed by: FAMILY MEDICINE

## 2021-03-08 PROCEDURE — 83690 ASSAY OF LIPASE: CPT | Performed by: FAMILY MEDICINE

## 2021-03-08 PROCEDURE — 99395 PREV VISIT EST AGE 18-39: CPT | Performed by: FAMILY MEDICINE

## 2021-03-08 PROCEDURE — 99213 OFFICE O/P EST LOW 20 MIN: CPT | Mod: 25 | Performed by: FAMILY MEDICINE

## 2021-03-08 ASSESSMENT — ANXIETY QUESTIONNAIRES
3. WORRYING TOO MUCH ABOUT DIFFERENT THINGS: MORE THAN HALF THE DAYS
5. BEING SO RESTLESS THAT IT IS HARD TO SIT STILL: SEVERAL DAYS
GAD7 TOTAL SCORE: 8
7. FEELING AFRAID AS IF SOMETHING AWFUL MIGHT HAPPEN: NOT AT ALL
2. NOT BEING ABLE TO STOP OR CONTROL WORRYING: SEVERAL DAYS
6. BECOMING EASILY ANNOYED OR IRRITABLE: SEVERAL DAYS
1. FEELING NERVOUS, ANXIOUS, OR ON EDGE: MORE THAN HALF THE DAYS
IF YOU CHECKED OFF ANY PROBLEMS ON THIS QUESTIONNAIRE, HOW DIFFICULT HAVE THESE PROBLEMS MADE IT FOR YOU TO DO YOUR WORK, TAKE CARE OF THINGS AT HOME, OR GET ALONG WITH OTHER PEOPLE: SOMEWHAT DIFFICULT

## 2021-03-08 ASSESSMENT — PATIENT HEALTH QUESTIONNAIRE - PHQ9
5. POOR APPETITE OR OVEREATING: SEVERAL DAYS
SUM OF ALL RESPONSES TO PHQ QUESTIONS 1-9: 10

## 2021-03-08 ASSESSMENT — MIFFLIN-ST. JEOR: SCORE: 1775.99

## 2021-03-08 NOTE — PATIENT INSTRUCTIONS
"  Preventive Health Recommendations  Male Ages 26 - 39    Yearly exam:             See your health care provider every year in order to  o   Review health changes.   o   Discuss preventive care.    o   Review your medicines if your doctor has prescribed any.    You should be tested each year for STDs (sexually transmitted diseases), if you re at risk.     After age 35, talk to your provider about cholesterol testing. If you are at risk for heart disease, have your cholesterol tested at least every 5 years.     If you are at risk for diabetes, you should have a diabetes test (fasting glucose).  Shots: Get a flu shot each year. Get a tetanus shot every 10 years.     Nutrition:    Eat at least 5 servings of fruits and vegetables daily.     Eat whole-grain bread, whole-wheat pasta and brown rice instead of white grains and rice.     Get adequate Calcium and Vitamin D.     Lifestyle    Exercise for at least 150 minutes a week (30 minutes a day, 5 days a week). This will help you control your weight and prevent disease.     Limit alcohol to one drink per day.     No smoking.     Wear sunscreen to prevent skin cancer.     See your dentist every six months for an exam and cleaning.   ASSESSMENT/PLAN:   1. Routine general medical examination at a health care facility  Patient has been advised of split billing requirements and indicates understanding: Yes  COUNSELING:  Reviewed preventive health counseling, as reflected in patient instructions       Regular exercise       Healthy diet/nutrition       Vision screening       Hearing screening  Estimated body mass index is 25.48 kg/m  as calculated from the following:    Height as of this encounter: 1.797 m (5' 10.75\").    Weight as of this encounter: 82.3 kg (181 lb 6.4 oz).  He reports that he has been smoking cigarettes. He has a 3.00 pack-year smoking history. He has never used smokeless tobacco.  Tobacco Cessation Action Plan:   Counseling Resources:  ATP IV " Guidelines  Pooled Cohorts Equation Calculator  FRAX Risk Assessment  ICSI Preventive Guidelines  Dietary Guidelines for Americans, 2010  USDA's MyPlate  ASA Prophylaxis  Lung CA Screening    2. Abdominal pain, generalized  We discussed the issues and do the above tests and we will notify you.   Avoid spicy foods and alcohol for a few days. Consider the liquid antacid at one ounce during the symptoms.   If the tests are normal and this is not diagnosed, then a CT scan of the abdomen is ordered and call [phone] to schedule.   - Comprehensive metabolic panel  - CBC with platelets differential  - Lipase  - UA reflex to Microscopic

## 2021-03-08 NOTE — LETTER
March 8, 2021      Fabricio C Jefe  [address]  [place] [address]        Dear ,    We are writing to inform you of your test results.    Your test results fall within the expected range(s)/normal.    Resulted Orders   Comprehensive metabolic panel   Result Value Ref Range    Sodium 137 133 - 144 mmol/L    Potassium 3.4 3.4 - 5.3 mmol/L    Chloride 103 94 - 109 mmol/L    Carbon Dioxide 31 20 - 32 mmol/L    Anion Gap 3 3 - 14 mmol/L    Glucose 87 70 - 99 mg/dL    Urea Nitrogen 10 7 - 30 mg/dL    Creatinine 0.85 0.66 - 1.25 mg/dL    GFR Estimate >90 >60 mL/min/[1.73_m2]      Comment:      Non  GFR Calc  Starting 12/18/2018, serum creatinine based estimated GFR (eGFR) will be   calculated using the Chronic Kidney Disease Epidemiology Collaboration   (CKD-EPI) equation.      GFR Estimate If Black >90 >60 mL/min/[1.73_m2]      Comment:       GFR Calc  Starting 12/18/2018, serum creatinine based estimated GFR (eGFR) will be   calculated using the Chronic Kidney Disease Epidemiology Collaboration   (CKD-EPI) equation.      Calcium 9.3 8.5 - 10.1 mg/dL    Bilirubin Total 0.5 0.2 - 1.3 mg/dL    Albumin 4.3 3.4 - 5.0 g/dL    Protein Total 7.4 6.8 - 8.8 g/dL    Alkaline Phosphatase 63 40 - 150 U/L    ALT 27 0 - 70 U/L    AST 22 0 - 45 U/L   CBC with platelets differential   Result Value Ref Range    WBC 7.0 4.0 - 11.0 10e9/L    RBC Count 4.60 4.4 - 5.9 10e12/L    Hemoglobin 14.5 13.3 - 17.7 g/dL    Hematocrit 44.9 40.0 - 53.0 %    MCV 98 78 - 100 fl    MCH 31.5 26.5 - 33.0 pg    MCHC 32.3 31.5 - 36.5 g/dL    RDW 13.4 10.0 - 15.0 %    Platelet Count 267 150 - 450 10e9/L    % Neutrophils 64.2 %    % Lymphocytes 25.4 %    % Monocytes 8.0 %    % Eosinophils 2.0 %    % Basophils 0.4 %    Absolute Neutrophil 4.5 1.6 - 8.3 10e9/L    Absolute Lymphocytes 1.8 0.8 - 5.3 10e9/L    Absolute Monocytes 0.6 0.0 - 1.3 10e9/L    Absolute Eosinophils 0.1 0.0 - 0.7 10e9/L    Absolute Basophils  0.0 0.0 - 0.2 10e9/L    Diff Method Automated Method    Lipase   Result Value Ref Range    Lipase 104 73 - 393 U/L   UA reflex to Microscopic   Result Value Ref Range    Color Urine Yellow     Appearance Urine Clear     Glucose Urine Negative NEG^Negative mg/dL    Bilirubin Urine Negative NEG^Negative    Ketones Urine Negative NEG^Negative mg/dL    Specific Gravity Urine <=1.005 1.003 - 1.035    Blood Urine Negative NEG^Negative    pH Urine 6.0 5.0 - 7.0 pH    Protein Albumin Urine Negative NEG^Negative mg/dL    Urobilinogen Urine 0.2 0.2 - 1.0 EU/dL    Nitrite Urine Negative NEG^Negative    Leukocyte Esterase Urine Negative NEG^Negative    Source Midstream Urine        If you have any questions or concerns, please call the clinic at the number listed above.       Sincerely,      Eliu Ndiaye MD

## 2021-03-08 NOTE — PROGRESS NOTES
3  SUBJECTIVE:   CC: Fabricio Mayberry is an 35 year old male who presents for preventive health visit.       Patient has been advised of split billing requirements and indicates understanding: Yes  Healthy Habits:    Do you get at least three servings of calcium containing foods daily (dairy, green leafy vegetables, etc.)? yes    Amount of exercise or daily activities, outside of work: 7 day(s) per week, active lifestyle    Problems taking medications regularly not applicable    Medication side effects: n/a    Have you had an eye exam in the past two years? no    Do you see a dentist twice per year? no    Do you have sleep apnea, excessive snoring or daytime drowsiness?no      ABDOMINAL   PAIN     Onset: 1 month    Description:   Character: Cramping, intermittent  Location: right lower quadrant left lower quadrant  Radiation: None    Intensity: 3/10    Progression of Symptoms:  same    Accompanying Signs & Symptoms:  Fever/Chills?: no   Gas/Bloating: no   Nausea: no   Vomitting: no   Diarrhea?: no   Constipation:no   Dysuria or Hematuria: no, does have some frequency   History:   Trauma: no   Previous similar pain: no    Previous tests done: none    Precipitating factors:   Does the pain change with:     Food: no      BM: no     Urination: no     Alleviating factors:  none    Therapies Tried and outcome: increase fluids    LMP:  not applicable    He is able to do all normal activities.       Today's PHQ-2 Score:   PHQ-2 ( 1999 [employer]) 3/8/2021   Q1: Little interest or pleasure in doing things 2   Q2: Feeling down, depressed or hopeless 3   PHQ-2 Score 5       Abuse: Current or Past(Physical, Sexual or Emotional)- No  Do you feel safe in your environment? Yes    Have you ever done Advance Care Planning? (For example, a Health Directive, POLST, or a discussion with a medical provider or your loved ones about your wishes): No, advance care planning information given to patient to review.  Patient plans to discuss their  "wishes with loved ones or provider.       Today the PHQ is 10 and the NANCY is 8.     Social History     Tobacco Use     Smoking status: Current Every Day Smoker     Packs/day: 0.50     Years: 6.00     Pack years: 3.00     Types: Cigarettes     Smokeless tobacco: Never Used   Substance Use Topics     Alcohol use: Yes     Comment: occ.     If you drink alcohol do you typically have >3 drinks per day or >7 drinks per week? No                      Reviewed orders with patient. Reviewed health maintenance and updated orders accordingly - Yes      Reviewed and updated as needed this visit by clinical staff  Tobacco  Allergies  Meds   Med Hx  Surg Hx  Fam Hx  Soc Hx        Reviewed and updated as needed this visit by Provider                  ROS:  CONSTITUTIONAL: NEGATIVE for fever, chills, change in weight  INTEGUMENTARY/SKIN: NEGATIVE for worrisome rashes, moles or lesions  EYES: NEGATIVE for vision changes or irritation  ENT: NEGATIVE for ear, mouth and throat problems  RESP: NEGATIVE for significant cough or SOB  CV: NEGATIVE for chest pain, palpitations or peripheral edema   male: negative for dysuria, hematuria, decreased urinary stream, erectile dysfunction, urethral discharge  MUSCULOSKELETAL: NEGATIVE for significant arthralgias or myalgia  NEURO: NEGATIVE for weakness, dizziness or paresthesias  PSYCHIATRIC: NEGATIVE for changes in mood or affect    OBJECTIVE:   /72 (BP Location: Left arm, Patient Position: Chair, Cuff Size: Adult Regular)   Pulse 88   Temp 98.1  F (36.7  C) (Tympanic)   Resp 12   Ht 1.797 m (5' 10.75\")   Wt 82.3 kg (181 lb 6.4 oz)   SpO2 98%   BMI 25.48 kg/m    EXAM:  Exam:  GENERAL APPEARANCE: healthy, alert and no distress  EYES: EOMI,  PERRL  HENT: ear canals and TM's normal and nose and mouth without ulcers or lesions  NECK: no adenopathy, no asymmetry, masses, or scars and thyroid normal to palpation  RESP: lungs clear to auscultation - no rales, rhonchi or wheezes  CV: " "regular rates and rhythm, normal S1 S2, no S3 or S4 and no murmur, click or rub -  ABDOMEN:  soft, nontender, no HSM or masses and bowel sounds normal  GU_male: testicles normal without atrophy or masses, no hernias and penis normal without urethral discharge  MS: extremities normal- no gross deformities noted, no evidence of inflammation in joints, FROM in all extremities.  SKIN: no suspicious lesions or rashes  NEURO: Normal strength and tone, sensory exam grossly normal, mentation intact and speech normal  PSYCH: mentation appears normal and affect normal/bright  LYMPHATICS: No axillary, cervical, inguinal, or supraclavicular nodes        ASSESSMENT/PLAN:   1. Routine general medical examination at a health care facility  Patient has been advised of split billing requirements and indicates understanding: Yes  COUNSELING:  Reviewed preventive health counseling, as reflected in patient instructions       Regular exercise       Healthy diet/nutrition       Vision screening       Hearing screening  Estimated body mass index is 25.48 kg/m  as calculated from the following:    Height as of this encounter: 1.797 m (5' 10.75\").    Weight as of this encounter: 82.3 kg (181 lb 6.4 oz).  He reports that he has been smoking cigarettes. He has a 3.00 pack-year smoking history. He has never used smokeless tobacco.  Tobacco Cessation Action Plan:   Counseling Resources:  ATP IV Guidelines  Pooled Cohorts Equation Calculator  FRAX Risk Assessment  ICSI Preventive Guidelines  Dietary Guidelines for Americans, 2010  USDA's MyPlate  ASA Prophylaxis  Lung CA Screening    2. Abdominal pain, generalized  We discussed the issues and do the above tests and we will notify you.   Avoid spicy foods and alcohol for a few days. Consider the liquid antacid at one ounce during the symptoms.   If the tests are normal and this is not diagnosed, then a CT scan of the abdomen is ordered and call [phone] to schedule.   - Comprehensive " metabolic panel  - CBC with platelets differential  - Lipase  - UA reflex to Microscopic    Eliu Ndiaye MD  [facility]

## 2021-03-09 ASSESSMENT — ANXIETY QUESTIONNAIRES: GAD7 TOTAL SCORE: 8

## 2021-03-22 ENCOUNTER — HOSPITAL ENCOUNTER (OUTPATIENT)
Dept: CT IMAGING | Facility: CLINIC | Age: 36
Discharge: HOME | End: 2021-03-22
Attending: FAMILY MEDICINE | Admitting: FAMILY MEDICINE
Payer: COMMERCIAL

## 2021-03-22 DIAGNOSIS — R10.84 ABDOMINAL PAIN, GENERALIZED: ICD-10-CM

## 2021-03-22 PROCEDURE — 250N000011 HC RX IP 250 OP 636: Performed by: RADIOLOGY

## 2021-03-22 PROCEDURE — 250N000009 HC RX 250: Performed by: RADIOLOGY

## 2021-03-22 PROCEDURE — 74177 CT ABD & PELVIS W/CONTRAST: CPT

## 2021-03-22 RX ORDER — IOPAMIDOL 755 MG/ML
89 INJECTION, SOLUTION INTRAVASCULAR ONCE
Status: COMPLETED | OUTPATIENT
Start: 2021-03-22 | End: 2021-03-22

## 2021-03-22 RX ADMIN — SODIUM CHLORIDE 62 ML: 9 INJECTION, SOLUTION INTRAVENOUS at 16:17

## 2021-03-22 RX ADMIN — IOPAMIDOL 89 ML: 755 INJECTION, SOLUTION INTRAVENOUS at 16:16

## 2021-04-03 ENCOUNTER — HEALTH MAINTENANCE LETTER (OUTPATIENT)
Age: 36
End: 2021-04-03

## 2021-09-18 ENCOUNTER — HEALTH MAINTENANCE LETTER (OUTPATIENT)
Age: 36
End: 2021-09-18

## 2022-01-14 ENCOUNTER — NURSE TRIAGE (OUTPATIENT)
Dept: FAMILY MEDICINE | Facility: CLINIC | Age: 37
End: 2022-01-14

## 2022-01-14 ENCOUNTER — ANCILLARY PROCEDURE (OUTPATIENT)
Dept: GENERAL RADIOLOGY | Facility: CLINIC | Age: 37
End: 2022-01-14
Attending: FAMILY MEDICINE
Payer: COMMERCIAL

## 2022-01-14 ENCOUNTER — OFFICE VISIT (OUTPATIENT)
Dept: FAMILY MEDICINE | Facility: CLINIC | Age: 37
End: 2022-01-14
Payer: COMMERCIAL

## 2022-01-14 VITALS
OXYGEN SATURATION: 97 % | TEMPERATURE: 98.1 F | DIASTOLIC BLOOD PRESSURE: 72 MMHG | HEART RATE: 73 BPM | SYSTOLIC BLOOD PRESSURE: 118 MMHG | RESPIRATION RATE: 16 BRPM | WEIGHT: 170 LBS | HEIGHT: 71 IN | BODY MASS INDEX: 23.8 KG/M2

## 2022-01-14 DIAGNOSIS — R07.81 RIB PAIN ON RIGHT SIDE: Primary | ICD-10-CM

## 2022-01-14 PROCEDURE — 71101 X-RAY EXAM UNILAT RIBS/CHEST: CPT | Mod: RT | Performed by: RADIOLOGY

## 2022-01-14 PROCEDURE — 99213 OFFICE O/P EST LOW 20 MIN: CPT | Performed by: FAMILY MEDICINE

## 2022-01-14 RX ORDER — HYDROCODONE BITARTRATE AND ACETAMINOPHEN 5; 325 MG/1; MG/1
1 TABLET ORAL EVERY 6 HOURS PRN
Qty: 12 TABLET | Refills: 0 | Status: SHIPPED | OUTPATIENT
Start: 2022-01-14 | End: 2022-01-17

## 2022-01-14 ASSESSMENT — ENCOUNTER SYMPTOMS
MYALGIAS: 1
PSYCHIATRIC NEGATIVE: 1
NEUROLOGICAL NEGATIVE: 1
ALLERGIC/IMMUNOLOGIC NEGATIVE: 1
EYES NEGATIVE: 1
HEMATOLOGIC/LYMPHATIC NEGATIVE: 1
ARTHRALGIAS: 1
RESPIRATORY NEGATIVE: 1
ENDOCRINE NEGATIVE: 1
CONSTITUTIONAL NEGATIVE: 1

## 2022-01-14 ASSESSMENT — PAIN SCALES - GENERAL: PAINLEVEL: SEVERE PAIN (7)

## 2022-01-14 ASSESSMENT — MIFFLIN-ST. JEOR: SCORE: 1725.49

## 2022-01-14 NOTE — PROGRESS NOTES
Assessment & Plan     Rib pain on right side  Pain medication faxed, recommend rest and icing.   - XR Ribs & Chest Right G/E 3 Views  - HYDROcodone-acetaminophen (NORCO) 5-325 MG tablet; Take 1 tablet by mouth every 6 hours as needed for pain    [id number]}     FUTURE APPOINTMENTS:       - Follow-up visit in one week or sooner as needed.    Return in about 1 week (around 1/21/2022), or if symptoms worsen or fail to improve, for Follow up.    Raven Alejo MD  [facility] is a 36 [facility] old who presents for the following health issues     HPI 36 yr old male here for rib pain. Was snow boarding  yesterday and fell on his right rib area. There is swelling and tenderness and it hurts to take a deep breath.    Pain History:  When did you first notice your pain? - Less than 1 week   Have you seen anyone else for your pain? No  Where in your body do you have pain? Musculoskeletal problem/pain  Onset/Duration: yesterday   Description  Location: right side rib pain -   Joint Swelling: no  Redness: no  Pain: YES 6-7/10  Warmth: YES  Intensity:  moderate  Progression of Symptoms:  improving  Accompanying signs and symptoms:   Fevers: no  Numbness/tingling/weakness: no  History  Trauma to the area: YES- snowboarding injury;   Recent illness:  no  Previous similar problem: no  Previous evaluation:  no  Precipitating or alleviating factors:  Aggravating factors include: walking, climbing stairs, lifting, exercise, overuse and coughing, breathing,   Therapies tried and outcome: rest/inactivity and epsom salt bath, aleve         How many servings of fruits and vegetables do you eat daily?  0-1    On average, how many sweetened beverages do you drink each day (Examples: soda, juice, sweet tea, etc.  Do NOT count diet or artificially sweetened beverages)?   1    How many days per week do you exercise enough to make your heart beat faster? 3 or less    How many minutes a day do you  "exercise enough to make your heart beat faster? 9 or less    How many days per week do you miss taking your medication? 0        Review of Systems   Constitutional: Negative.    HENT: Negative.    Eyes: Negative.    Respiratory: Negative.    Endocrine: Negative.    Genitourinary: Negative.    Musculoskeletal: Positive for arthralgias and myalgias.   Skin: Negative.    Allergic/Immunologic: Negative.    Neurological: Negative.    Hematological: Negative.    Psychiatric/Behavioral: Negative.             Objective    /72 (BP Location: Left arm, Patient Position: Sitting, Cuff Size: Adult Regular)   Pulse 73   Temp 98.1  F (36.7  C) (Tympanic)   Resp 16   Ht 1.807 m (5' 11.14\")   Wt 77.1 kg (170 lb)   SpO2 97%   BMI 23.62 kg/m    Body mass index is 23.62 kg/m .  Physical Exam  Constitutional:       Appearance: Normal appearance.   HENT:      Head: Normocephalic and atraumatic.      Right Ear: Tympanic membrane normal.      Left Ear: Tympanic membrane normal.      Mouth/Throat:      Mouth: Mucous membranes are moist.   Eyes:      Extraocular Movements: Extraocular movements intact.      Pupils: Pupils are equal, round, and reactive to light.   Cardiovascular:      Rate and Rhythm: Normal rate and regular rhythm.      Pulses: Normal pulses.      Heart sounds: Normal heart sounds.   Pulmonary:      Effort: Pulmonary effort is normal.      Breath sounds: Normal breath sounds.   Chest:      Chest wall: Tenderness present.          Comments: Pain in the upper right chest wall area  Musculoskeletal:         General: Swelling and tenderness present.      Cervical back: Normal range of motion and neck supple.   Skin:     General: Skin is warm and dry.   Neurological:      Mental Status: He is alert and oriented to person, place, and time.   Psychiatric:         Mood and Affect: Mood normal.         Behavior: Behavior normal.            Results for orders placed or performed in visit on 01/14/22   XR Ribs & Chest " Right G/E 3 Views    Impression    IMPRESSION: Old healed fracture of the anterior right second rib is  new in the interval. No acute fractures are evident. Normal heart size  and pulmonary vascularity. No pleural effusions are evident.  Degenerative changes in the glenohumeral joint.    ALEXUS BUTT MD         SYSTEM ID:  [id number]

## 2022-01-14 NOTE — TELEPHONE ENCOUNTER
"    Additional Information    Negative: Major injury from dangerous force or speed (e.g., MVA, fall > 10 feet or 3 meters)    Negative: Bullet wound, knife wound or other penetrating object    Negative: Puncture wound that sounds life-threatening to the triager    Negative: Severe difficulty breathing (e.g., struggling for each breath, speaks in single words)    Negative: Major bleeding (actively dripping or spurting) that can't be stopped    Negative: Open wound of the chest with sound of moving air (sucking wound) or visible air bubbles    Negative: Shock suspected (e.g., cold/pale/clammy skin, too weak to stand, low BP, rapid pulse)    Negative: Coughing or spitting up blood    Negative: Bluish (or gray) lips or face    Negative: Unconscious or was unconscious    Negative: Sounds like a life-threatening emergency to the triager    Negative: Chest pain not from an injury    Negative: Wound looks infected    Negative: SEVERE chest pain    Negative: Difficulty breathing and not severe    Negative: Skin is split open or gaping (or length > 1/2 inch or 12 mm)    Negative: Bleeding won't stop after 10 minutes of direct pressure (using correct technique)    Negative: Sounds like a serious injury to the triager    Negative: Can't take a deep breath but no respiratory distress (e.g., hurts to take a deep breath)    Negative: Shallow puncture wound    Negative: Collarbone is painful and difficulty raising arm    Negative: Patient is confused or is an unreliable provider of information (e.g., dementia, profound mental retardation, alcohol intoxication)    Negative: No prior tetanus shots (or is not fully vaccinated) and any wound (e.g., cut or scrape)    Patient wants to be seen    Answer Assessment - Initial Assessment Questions  1. MECHANISM: \"How did the injury happen?\"      Fell yesterday snowboarding  2. ONSET: \"When did the injury happen?\" (Minutes or hours ago)      yesterday  3. LOCATION: \"Where on the chest is the " "injury located?\"      (R) rib  4. APPEARANCE: \"What does the injury look like?\"      A little swelling  5. BLEEDING: \"Is there any bleeding now? If so, ask: How long has it been bleeding?\"      no  6. SEVERITY: \"Any difficulty with breathing?\"      no  7. SIZE: For cuts, bruises, or swelling, ask: \"How large is it?\" (e.g., inches or centimeters)      Mild swelling  8. PAIN: \"Is there pain?\" If so, ask: \"How bad is the pain?\"   (e.g., Scale 1-10; or mild, moderate, severe)      Rates a 6-7 when up and about  9. TETANUS: For any breaks in the skin, ask: \"When was the last tetanus booster?\"      yes  10. PREGNANCY: \"Is there any chance you are pregnant?\" \"When was your last menstrual period?\"        N/a    Protocols used: CHEST INJURY-A-[place]      "

## 2022-01-14 NOTE — PATIENT INSTRUCTIONS
Patient Education     Rib Bruise   A rib bruise (contusion) can affect one or more rib bones. It may cause pain, tenderness, swelling, and a purplish discoloration. There may be a sharp pain while breathing.   You will be assessed for other injuries. You will likely be given pain medicine. Bruised ribs heal on their own, without further treatment. But the pain may take weeks to months to go away.    Note that a small crack (fracture) in the rib may cause the same symptoms as a bruised rib. The small crack may not be seen on a chest X-ray. But the conditions are managed in the same way.   Home care    Rest. Don't do heavy lifting, strenuous exertion, or any activity that causes pain.    Ice the area to reduce pain and swelling. Put ice cubes in a plastic bag or use a cold pack. Wrap the cold source in a thin towel. Don't place it directly on your skin. Ice the injured area for 20 minutes every 1 to 2 hours the first day. Continue with ice packs 3 to 4 times a day for the next 2 days, then as needed for the relief of pain and swelling.    Take any prescribed pain medicine as directed by your healthcare provider. If none was prescribed, take acetaminophen, ibuprofen, or naproxen to control pain.    If you have a significant injury, you may be given a device called an incentive spirometer to keep your lungs healthy. Use as directed.    Follow-up care  Follow up with your healthcare provider, or as advised.   When to seek medical advice  Call your healthcare provider for any of the following:     Increasing chest pain with breathing    Coughing    New or worsening pain    Fever of 100.4 F (38 C) or higher, or as directed by your healthcare provider  Call 911  Call 911, or get medical care right away if any of the following occur:     Shortness of breath or trouble breathing    Dizziness, weakness, or fainting  [employer] last reviewed this educational content on 8/1/2019 2000-2021 [employer]. All rights  reserved. This information is not intended as a substitute for professional medical care. Always follow your healthcare professional's instructions.

## 2022-04-02 ENCOUNTER — NURSE TRIAGE (OUTPATIENT)
Dept: NURSING | Facility: CLINIC | Age: 37
End: 2022-04-02
Payer: COMMERCIAL

## 2022-04-02 NOTE — TELEPHONE ENCOUNTER
"Triage Call:     Pt and patient's mother calling to report that patient has had the \"flu since Monday\"  Tested negative for COVID-19    Today if it 'getting worse\"  Sleeping every day since Monday  Diarrhea and throwing up  Throwing up: none today  Diarrhea: none today    Fever T: 98.6  Night sweats  Cough; super mild  Headache; took ibuprofen, cold wash cloth  Sensitive to sound  Headache Pain: 7/10    Hasn't eaten today  Drinking water    Pt's mother had bacterial meningis September    Disposition: See PCP within 24 hours. Pt was given care advice and was given information for the [facility].     Glenis Diaz RN  [facility] Nurse Advisor 1:01 PM 4/2/2022    Reason for Disposition    [1] MODERATE headache (e.g., interferes with normal activities) AND [2] present > 24 hours AND [3] unexplained  (Exceptions: analgesics not tried, typical migraine, or headache part of viral illness)    Additional Information    Negative: Difficult to awaken or acting confused (e.g., disoriented, slurred speech)    Negative: [1] Weakness of the face, arm or leg on one side of the body AND [2] new onset    Negative: [1] Numbness of the face, arm or leg on one side of the body AND [2] new onset    Negative: [1] Loss of speech or garbled speech AND [2] new onset    Negative: Passed out (i.e., lost consciousness, collapsed and was not responding)    Negative: Sounds like a life-threatening emergency to the triager    Negative: Followed a head injury    Negative: Pregnant    Negative: Postpartum (from 0 to 6 weeks after delivery)    Negative: Traumatic Brain Injury (TBI) is suspected    Negative: Unable to walk, or can only walk with assistance (e.g., requires support)    Negative: Stiff neck (can't touch chin to chest)    Negative: Severe pain in one eye    Negative: [1] Other family members (or roommates) with headaches AND [2] possibility of carbon monoxide exposure    Negative: [1] SEVERE headache (e.g., excruciating) AND [2] " "\"worst headache\" of life    Negative: [1] SEVERE headache AND [2] sudden-onset (i.e., reaching maximum intensity within seconds)    Negative: [1] SEVERE headache AND [2] fever    Negative: Loss of vision or double vision (Exception: same as prior migraines)    Negative: [1] Fever > 100.0 F (37.8 C) AND [2] diabetes mellitus or weak immune system (e.g., HIV positive, cancer chemo, splenectomy, organ transplant, chronic steroids)    Negative: Patient sounds very sick or weak to the triager    Negative: [1] SEVERE headache (e.g., excruciating) AND [2] not improved after 2 hours of pain medicine    Negative: [1] Vomiting AND [2] 2 or more times (Exception: similar to previous migraines)    Negative: Fever > 104 F (40 C)    Negative: [1] New headache AND [2] age > 50    Negative: [1] New headache AND [2] weak immune system (e.g., HIV positive, cancer chemo, splenectomy, organ transplant, chronic steroids)    Negative: [1] Sinus pain of forehead AND [2] yellow or green nasal discharge    Protocols used: HEADACHE-A-AH    COVID 19 Nurse Triage Plan/Patient Instructions    Please be aware that novel coronavirus (COVID-19) may be circulating in the community. If you develop symptoms such as fever, cough, or SOB or if you have concerns about the presence of another infection including coronavirus (COVID-19), please contact your health care provider or visit [email or web address].     Disposition/Instructions    In-Person Visit with provider recommended. Reference Visit Selection Guide.    Thank you for taking steps to prevent the spread of this virus.  o Limit your contact with others.  o Wear a simple mask to cover your cough.  o Wash your hands well and often.    Resources    [facility]: About COVID-19: [email or web address]    CDC: What to Do If You're Sick: www.cdc.gov/coronavirus/2019-ncov/about/steps-when-sick.html    CDC: Ending Home Isolation: " www.cdc.gov/coronavirus/2019-ncov/hcp/disposition-in-home-patients.html     CDC: Caring for Someone: www.cdc.gov/coronavirus/2019-ncov/if-you-are-sick/care-for-someone.html     [facility]: Interim Guidance for Hospital Discharge to Home: www.[facility].[place]/diseases/coronavirus/hcp/hospdischarge.pdf    [facility] clinical trials (COVID-19 research studies): [email or web address].[facility]/n-clinical-trials     Below are the COVID-19 hotlines at the [place] [facility] ([facility]). Interpreters are available.   o For health questions: Call 655-199-2017 or 1-428.285.3795 (7 a.m. to 7 p.m.)  o For questions about schools and childcare: Call 015-086-7969 or 1-191.345.3150 (7 a.m. to 7 p.m.)

## 2022-04-24 ENCOUNTER — HEALTH MAINTENANCE LETTER (OUTPATIENT)
Age: 37
End: 2022-04-24

## 2022-11-19 ENCOUNTER — HEALTH MAINTENANCE LETTER (OUTPATIENT)
Age: 37
End: 2022-11-19

## 2023-06-01 ENCOUNTER — HEALTH MAINTENANCE LETTER (OUTPATIENT)
Age: 38
End: 2023-06-01

## 2023-12-18 ENCOUNTER — OFFICE VISIT (OUTPATIENT)
Dept: FAMILY MEDICINE | Facility: CLINIC | Age: 38
End: 2023-12-18
Payer: COMMERCIAL

## 2023-12-18 VITALS
DIASTOLIC BLOOD PRESSURE: 66 MMHG | SYSTOLIC BLOOD PRESSURE: 124 MMHG | HEIGHT: 72 IN | HEART RATE: 80 BPM | TEMPERATURE: 96.8 F | RESPIRATION RATE: 16 BRPM | OXYGEN SATURATION: 98 % | WEIGHT: 178 LBS | BODY MASS INDEX: 24.11 KG/M2

## 2023-12-18 DIAGNOSIS — K42.9 UMBILICAL HERNIA WITHOUT OBSTRUCTION AND WITHOUT GANGRENE: Primary | ICD-10-CM

## 2023-12-18 PROCEDURE — 99213 OFFICE O/P EST LOW 20 MIN: CPT | Performed by: NURSE PRACTITIONER

## 2023-12-18 ASSESSMENT — PAIN SCALES - GENERAL: PAINLEVEL: NO PAIN (0)

## 2023-12-18 NOTE — PROGRESS NOTES
"  Assessment & Plan     Umbilical hernia without obstruction and without gangrene  Not bothersome, reviewed options, and for now he would like to monitor.  He will let me know if he changes his mind and would like a general surgery consult in the future.  We reviewed signs and symptoms of worsening illness.     Nicotine/Tobacco Cessation:  He reports that he has been smoking cigarettes. He has a 3.00 pack-year smoking history. He has never used smokeless tobacco.  Nicotine/Tobacco Cessation Plan:         See Patient Instructions    TARIQ Barros CNP  [facility] 38 year old, presenting for the following health issues:  Abdominal Pain        12/18/2023     7:13 AM   Additional Questions   Roomed by Amber SLADE   Accompanied by self       History of Present Illness       Reason for visit:  Hernia  Symptom onset:  More than a month  Symptoms include:  Mild  Symptom intensity:  Mild  Symptom progression:  Staying the same  Had these symptoms before:  No  What makes it worse:  No  What makes it better:  No    He eats 2-3 servings of fruits and vegetables daily.He consumes 1 sweetened beverage(s) daily.He exercises with enough effort to increase his heart rate 30 to 60 minutes per day.  He exercises with enough effort to increase his heart rate 5 days per week.   He is taking medications regularly.     Above HPI reviewed. Additionally, notes that since the summertime, has had a small bulge near the superior portion of his umbilicus.  It is not painful.  He has had normal bowel movements, no hematochezia or melena.        Review of Systems   Constitutional, HEENT, cardiovascular, pulmonary, gi and gu systems are negative, except as otherwise noted.      Objective    /66   Pulse 80   Temp 96.8  F (36  C) (Tympanic)   Resp 16   Ht 1.816 m (5' 11.5\")   Wt 80.7 kg (178 lb)   SpO2 98%   BMI 24.48 kg/m    Body mass index is 24.48 kg/m .  Physical Exam  Vitals and " nursing note reviewed.   Constitutional:       Appearance: Normal appearance.   HENT:      Head: Normocephalic and atraumatic.      Mouth/Throat:      Mouth: Mucous membranes are moist.   Cardiovascular:      Rate and Rhythm: Normal rate.   Pulmonary:      Effort: Pulmonary effort is normal.   Abdominal:      Hernia: A hernia is present. Hernia is present in the umbilical area (Small, easily reducible).   Musculoskeletal:      Cervical back: Neck supple.   Skin:     General: Skin is warm and dry.   Neurological:      General: No focal deficit present.      Mental Status: He is alert.   Psychiatric:         Mood and Affect: Mood normal.         Behavior: Behavior normal.

## 2024-06-16 ENCOUNTER — HEALTH MAINTENANCE LETTER (OUTPATIENT)
Age: 39
End: 2024-06-16

## 2025-06-21 ENCOUNTER — HEALTH MAINTENANCE LETTER (OUTPATIENT)
Age: 40
End: 2025-06-21